# Patient Record
Sex: FEMALE | Race: WHITE | NOT HISPANIC OR LATINO | Employment: UNEMPLOYED | URBAN - METROPOLITAN AREA
[De-identification: names, ages, dates, MRNs, and addresses within clinical notes are randomized per-mention and may not be internally consistent; named-entity substitution may affect disease eponyms.]

---

## 2024-01-24 ENCOUNTER — TELEMEDICINE (OUTPATIENT)
Dept: FAMILY MEDICINE CLINIC | Facility: TELEHEALTH | Age: 24
End: 2024-01-24
Payer: MEDICAID

## 2024-01-24 DIAGNOSIS — K08.89 DENTALGIA: Primary | ICD-10-CM

## 2024-01-24 RX ORDER — PREDNISONE 10 MG/1
TABLET ORAL DAILY
Qty: 21 EACH | Refills: 0 | Status: SHIPPED | OUTPATIENT
Start: 2024-01-24 | End: 2024-01-30

## 2024-01-24 RX ORDER — AMOXICILLIN AND CLAVULANATE POTASSIUM 875; 125 MG/1; MG/1
1 TABLET, FILM COATED ORAL 2 TIMES DAILY
Qty: 14 TABLET | Refills: 0 | Status: SHIPPED | OUTPATIENT
Start: 2024-01-24 | End: 2024-01-31

## 2024-01-24 NOTE — PATIENT INSTRUCTIONS
Follow-up with a dentist as soon as possible.  Tylenol for pain and/or fever, stay hydrated and rest.   Salt water gargles frequently and after eating.    If symptoms worsen or do not improve follow up with your PCP or visit your nearest Urgent Care Center or ER.

## 2024-01-24 NOTE — PROGRESS NOTES
Subjective   Chief Complaint   Patient presents with    Dental Problem       Jason Carrington is a 23 y.o. female.     History of Present Illness  Treat patient reports having a right lower broken tooth for the past couple of years, she had a failed root canal but has not been able to afford to return to the dentist for this.  She is now experiencing pain, mild swelling of the gum surrounding the tooth, bad taste in her mouth and mild swelling of the jaw for the past week.  She is requesting antibiotics.  Dental Pain   This is a new problem. Episode onset: 1 week. The problem has been gradually worsening. The pain is moderate. Associated symptoms include facial pain and sinus pressure. Pertinent negatives include no difficulty swallowing, fever, oral bleeding or thermal sensitivity.        No Known Allergies    Past Medical History:   Diagnosis Date    Anxiety        Past Surgical History:   Procedure Laterality Date    ADENOIDECTOMY      EAR TUBES      TONSILLECTOMY         Social History     Socioeconomic History    Marital status: Single   Tobacco Use    Smoking status: Never     Passive exposure: Never    Smokeless tobacco: Never   Vaping Use    Vaping Use: Never used   Substance and Sexual Activity    Alcohol use: Never    Drug use: Never       History reviewed. No pertinent family history.      Current Outpatient Medications:     amoxicillin-clavulanate (AUGMENTIN) 875-125 MG per tablet, Take 1 tablet by mouth 2 (Two) Times a Day for 7 days., Disp: 14 tablet, Rfl: 0    benzonatate (TESSALON) 100 MG capsule, Take 1 capsule by mouth 3 (Three) Times a Day As Needed for Cough., Disp: 30 capsule, Rfl: 0    predniSONE (DELTASONE) 10 MG (21) dose pack, Take  by mouth Daily for 6 days., Disp: 21 each, Rfl: 0    promethazine-dextromethorphan (PROMETHAZINE-DM) 6.25-15 MG/5ML syrup, Take 5 mL by mouth 4 (Four) Times a Day As Needed for Cough., Disp: 180 mL, Rfl: 0      Review of Systems   Constitutional:  Positive for  fatigue. Negative for chills, diaphoresis and fever.   HENT:  Positive for dental problem, facial swelling and sinus pressure.    Gastrointestinal: Negative.    Musculoskeletal:  Negative for myalgias.   Neurological:  Negative for headache.        There were no vitals filed for this visit.    Objective   Physical Exam  Constitutional:       General: She is not in acute distress.     Appearance: Normal appearance. She is not ill-appearing, toxic-appearing or diaphoretic.   HENT:      Head: Normocephalic.      Mouth/Throat:      Lips: Pink.      Mouth: Mucous membranes are moist.      Dentition: Dental tenderness and gingival swelling present.     Pulmonary:      Effort: Pulmonary effort is normal.   Lymphadenopathy:      Head:      Right side of head: Submandibular adenopathy present.      Cervical: Cervical adenopathy present.      Comments: Per pt    Neurological:      Mental Status: She is alert and oriented to person, place, and time.          Procedures     Assessment & Plan   Diagnoses and all orders for this visit:    1. Dentalgia (Primary)  -     amoxicillin-clavulanate (AUGMENTIN) 875-125 MG per tablet; Take 1 tablet by mouth 2 (Two) Times a Day for 7 days.  Dispense: 14 tablet; Refill: 0  -     predniSONE (DELTASONE) 10 MG (21) dose pack; Take  by mouth Daily for 6 days.  Dispense: 21 each; Refill: 0        Follow-up with a dentist as soon as possible.  Tylenol for pain and/or fever, stay hydrated and rest.   Salt water gargles frequently and after eating.    If symptoms worsen or do not improve follow up with your PCP or visit your nearest Urgent Care Center or ER.    PLAN: Discussed dosing, side effects, recommended other symptomatic care.  Patient should follow up with primary care provider, Urgent Care or ER if symptoms worsen, fail to resolve or other symptoms need attention. Patient/family agree to the above.         MEENA Joaquin     The use of a video visit has been reviewed with the  patient and verbal informed consent has been obtained. Myself and Jason Charissa participated in this visit. The patient is located at [address] [place] [address]. I am located in [place], [place]. Mychart and Zoom were utilized.        This visit was performed via Telehealth.  This patient has been instructed to follow-up with their primary care provider if their symptoms worsen or the treatment provided does not resolve their illness.

## 2024-05-09 ENCOUNTER — TELEMEDICINE (OUTPATIENT)
Dept: FAMILY MEDICINE CLINIC | Facility: TELEHEALTH | Age: 24
End: 2024-05-09
Payer: MEDICAID

## 2024-05-09 DIAGNOSIS — K04.7 DENTAL ABSCESS: Primary | ICD-10-CM

## 2024-05-09 RX ORDER — IBUPROFEN 800 MG/1
800 TABLET ORAL EVERY 6 HOURS PRN
Qty: 60 TABLET | Refills: 0 | Status: SHIPPED | OUTPATIENT
Start: 2024-05-09 | End: 2024-06-08

## 2024-05-09 RX ORDER — AMOXICILLIN AND CLAVULANATE POTASSIUM 875; 125 MG/1; MG/1
1 TABLET, FILM COATED ORAL 2 TIMES DAILY
Qty: 14 TABLET | Refills: 0 | Status: SHIPPED | OUTPATIENT
Start: 2024-05-09 | End: 2024-05-16